# Patient Record
Sex: FEMALE | Race: OTHER | HISPANIC OR LATINO | URBAN - METROPOLITAN AREA
[De-identification: names, ages, dates, MRNs, and addresses within clinical notes are randomized per-mention and may not be internally consistent; named-entity substitution may affect disease eponyms.]

---
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
## 2018-07-05 ENCOUNTER — EMERGENCY (EMERGENCY)
Facility: HOSPITAL | Age: 18
LOS: 1 days | Discharge: HOME | End: 2018-07-05
Attending: EMERGENCY MEDICINE
Payer: MEDICAID

## 2018-07-05 VITALS
OXYGEN SATURATION: 98 % | HEIGHT: 60 IN | RESPIRATION RATE: 18 BRPM | TEMPERATURE: 98 F | DIASTOLIC BLOOD PRESSURE: 78 MMHG | SYSTOLIC BLOOD PRESSURE: 119 MMHG | HEART RATE: 92 BPM | WEIGHT: 100.09 LBS

## 2018-07-05 PROCEDURE — 99284 EMERGENCY DEPT VISIT MOD MDM: CPT

## 2018-07-05 RX ORDER — PSEUDOEPHEDRINE HCL 30 MG
1 TABLET ORAL
Qty: 30 | Refills: 0 | OUTPATIENT
Start: 2018-07-05

## 2018-07-05 RX ORDER — PSEUDOEPHEDRINE HCL 30 MG
60 TABLET ORAL ONCE
Qty: 0 | Refills: 0 | Status: COMPLETED | OUTPATIENT
Start: 2018-07-05 | End: 2018-07-05

## 2018-07-05 RX ORDER — ACETAMINOPHEN 500 MG
975 TABLET ORAL ONCE
Qty: 0 | Refills: 0 | Status: COMPLETED | OUTPATIENT
Start: 2018-07-05 | End: 2018-07-05

## 2018-07-05 RX ORDER — IBUPROFEN 200 MG
1 TABLET ORAL
Qty: 30 | Refills: 0 | OUTPATIENT
Start: 2018-07-05

## 2018-07-05 RX ADMIN — Medication 60 MILLIGRAM(S): at 22:04

## 2018-07-05 RX ADMIN — Medication 975 MILLIGRAM(S): at 22:04

## 2018-07-05 NOTE — ED PROVIDER NOTE - OBJECTIVE STATEMENT
16 y/o F pt with PMHx of H. Pylori Infection and no PSHx BIB father to ED c/o nasal congestion, runny nose, and cough x4 days as well as HA x1 day. Pt reports taking x2 tablets of Flanax (Naproxen) with no relief of sx's; pt last took Flanax at 19:00pm today. Pt describes mucous as green in color. Per pt, pt with recent sick contacts at home as pt's sibling (brother) currently has similar sx's. Pt notes she did not take her temperature at home PTA in ED today. Pt denies any other complaints. NKDA.

## 2018-07-05 NOTE — ED ADULT NURSE NOTE - OBJECTIVE STATEMENT
Pt AOx3, ambulatory, c/o headache, sinus pressure, cough, runny nose, x 1 week. Pt endorses nasal congestion, throat pain. Pt deneis n/v, dizziness, SOB.

## 2018-07-05 NOTE — ED ADULT TRIAGE NOTE - CHIEF COMPLAINT QUOTE
c/o headache x 1 day  denies any nausea and vomiting and also with nasal congestion. headache triggers when she coughs

## 2019-10-02 NOTE — ED ADULT NURSE NOTE - PAIN RATING/NUMBER SCALE (0-10): ACTIVITY
Pt would like lab order to be faxed to [facility] on [facility] today. Thanks. 8

## 2020-03-21 ENCOUNTER — EMERGENCY (EMERGENCY)
Facility: HOSPITAL | Age: 20
LOS: 1 days | Discharge: HOME | End: 2020-03-21
Attending: STUDENT IN AN ORGANIZED HEALTH CARE EDUCATION/TRAINING PROGRAM
Payer: SELF-PAY

## 2020-03-21 VITALS
RESPIRATION RATE: 16 BRPM | OXYGEN SATURATION: 100 % | SYSTOLIC BLOOD PRESSURE: 107 MMHG | HEART RATE: 68 BPM | DIASTOLIC BLOOD PRESSURE: 69 MMHG | TEMPERATURE: 98 F | WEIGHT: 115.08 LBS | HEIGHT: 60 IN

## 2020-03-21 PROBLEM — A04.8 OTHER SPECIFIED BACTERIAL INTESTINAL INFECTIONS: Chronic | Status: ACTIVE | Noted: 2018-07-05

## 2020-03-21 PROCEDURE — 99282 EMERGENCY DEPT VISIT SF MDM: CPT

## 2020-03-21 PROCEDURE — U0001: CPT

## 2020-03-21 PROCEDURE — 99283 EMERGENCY DEPT VISIT LOW MDM: CPT

## 2020-03-21 NOTE — ED PROVIDER NOTE - CLINICAL SUMMARY MEDICAL DECISION MAKING FREE TEXT BOX
Patient presents for symptoms of coronavirus.  Patient not tachypneic or hypoxic.  Will test, return precautions provided, will advise to self isolate.

## 2020-03-21 NOTE — ED ADULT NURSE NOTE - CAS ELECT INFOMATION PROVIDED
Patient seen and examined by MD/ACP. Patient swabbed for COVID-19 and discharged by MD/ACp./DC instructions

## 2020-03-21 NOTE — ED PROVIDER NOTE - OBJECTIVE STATEMENT
Patient presents to ED for testing for corona virus.  Patient presents with flu like symptoms for a few days, requesting testing done.

## 2020-03-21 NOTE — ED PROVIDER NOTE - PATIENT PORTAL LINK FT
You can access the FollowMyHealth Patient Portal offered by [facility] by registering at the following website: [email or web address]. By joining [facility]’s FollowMyHealth portal, you will also be able to view your health information using other applications (apps) compatible with our system.

## 2020-03-22 LAB — SARS-COV-2 RNA SPEC QL NAA+PROBE: DETECTED

## 2020-05-11 NOTE — ED ADULT NURSE NOTE - OBJECTIVE STATEMENT
c/o dizziness x11/2months, abdominal pain x2 weeks. weight  loss about 20lbs . Attending Only c/o dizziness x11/2months, abdominal pain x2 weeks. weight  loss about 20lbs .Abdomen soft non tender +BS.

## 2022-05-07 NOTE — ED PROVIDER NOTE - RELIEVING FACTORS
Lele Minor)  Pediatrics  [facility], [address] [place] [address]  Phone: [phone]  Fax: [phone]  Follow Up Time:   
none

## 2024-04-07 NOTE — ED ADULT NURSE NOTE - NSSISCREENINGQ4_ED_A_ED
Pt ambulated to bathroom at this time, no assistance required. No signs of distress noted.     Maame Guerrero  04/07/24 0414     No

## 2025-02-16 ENCOUNTER — INPATIENT (INPATIENT)
Facility: HOSPITAL | Age: 25
LOS: 1 days | Discharge: HOME | DRG: 951 | End: 2025-02-18
Attending: OBSTETRICS & GYNECOLOGY | Admitting: OBSTETRICS & GYNECOLOGY
Payer: OTHER GOVERNMENT

## 2025-02-16 VITALS
TEMPERATURE: 98 F | RESPIRATION RATE: 16 BRPM | OXYGEN SATURATION: 99 % | HEART RATE: 67 BPM | DIASTOLIC BLOOD PRESSURE: 67 MMHG | SYSTOLIC BLOOD PRESSURE: 115 MMHG

## 2025-02-16 DIAGNOSIS — O26.899 OTHER SPECIFIED PREGNANCY RELATED CONDITIONS, UNSPECIFIED TRIMESTER: ICD-10-CM

## 2025-02-16 NOTE — OB RN TRIAGE NOTE - NS_ARRIVALFROM_OBGYN_ALL_OB
ER physician updating pt on results and plan of care for discharge home with concerns addressed.   Home

## 2025-02-16 NOTE — OB RN TRIAGE NOTE - FALL HARM RISK - UNIVERSAL INTERVENTIONS
Bed in lowest position, wheels locked, appropriate side rails in place/Call bell, personal items and telephone in reach/Instruct patient to call for assistance before getting out of bed or chair/Non-slip footwear when patient is out of bed/[place] to call system/Physically safe environment - no spills, clutter or unnecessary equipment/Purposeful Proactive Rounding/Room/bathroom lighting operational, light cord in reach

## 2025-02-16 NOTE — OB RN TRIAGE NOTE - FALL HARM RISK - PATIENT NEEDS ASSISTANCE
Problem: Discharge Planning  Goal: Discharge to home or other facility with appropriate resources  Outcome: Progressing  Flowsheets (Taken 10/7/2022 1315)  Discharge to home or other facility with appropriate resources:   Identify barriers to discharge with patient and caregiver   Identify discharge learning needs (meds, wound care, etc)   Arrange for needed discharge resources and transportation as appropriate  Note: Patient from home with spouse. Patient plans to return home with spouse at discharge. Problem: Pain  Goal: Verbalizes/displays adequate comfort level or baseline comfort level  Outcome: Progressing  Flowsheets (Taken 10/7/2022 1315)  Verbalizes/displays adequate comfort level or baseline comfort level:   Encourage patient to monitor pain and request assistance   Administer analgesics based on type and severity of pain and evaluate response   Assess pain using appropriate pain scale   Implement non-pharmacological measures as appropriate and evaluate response  Note: Pain Assessment: 0-10  Pain Level: 6   Patient's Stated Pain Goal: 0 - No pain   Is pain goal met at this time? No     Non-Pharmaceutical Pain Intervention(s): Rest, Repositioned, and PRN pain medication. Problem: Safety - Adult  Goal: Free from fall injury  Outcome: Progressing  Flowsheets (Taken 10/7/2022 1315)  Free From Fall Injury: Instruct family/caregiver on patient safety  Note: No falls noted this shift. Fall risk assessment completed. Hourly rounding performed. Bed locked in lowest position, bed alarm on, call light and personal items within reach, and fall sign posted. Patient ambulates to the bathroom with staff assistance nearby.       Problem: ABCDS Injury Assessment  Goal: Absence of physical injury  Outcome: Progressing     Problem: Respiratory - Adult  Goal: Achieves optimal ventilation and oxygenation  Outcome: Progressing  Flowsheets (Taken 10/7/2022 1315)  Achieves optimal ventilation and oxygenation:   Assess for changes in respiratory status   Position to facilitate oxygenation and minimize respiratory effort   Assess the need for suctioning and aspirate as needed   Respiratory therapy support as indicated   Assess for changes in mentation and behavior   Oxygen supplementation based on oxygen saturation or arterial blood gases  Note: Lung sounds are clear and diminished. O2 saturation remains greater than 90% on 1L. Weaned off during day. Problem: Cardiovascular - Adult  Goal: Maintains optimal cardiac output and hemodynamic stability  Outcome: Progressing  Flowsheets (Taken 10/7/2022 1315)  Maintains optimal cardiac output and hemodynamic stability:   Monitor blood pressure and heart rate   Monitor urine output and notify Licensed Independent Practitioner for values outside of normal range   Assess for signs of decreased cardiac output  Note: Vital signs monitored every 4 hours. Continuous cardiac monitor remains in place. Vitals:    10/07/22 0730 10/07/22 1200 10/07/22 1232 10/07/22 1238   BP: (!) 100/51 (!) 114/56 112/70    Pulse: 77 72 78    Resp: 19 16 18 18   Temp: 97.5 °F (36.4 °C) 98 °F (36.7 °C) 97.7 °F (36.5 °C)    TempSrc:   Oral    SpO2: 96% 96% 96%    Weight: 214 lb 4.6 oz (97.2 kg) 213 lb 3 oz (96.7 kg)     Height:           Problem: Skin/Tissue Integrity - Adult  Goal: Skin integrity remains intact  Outcome: Progressing  Flowsheets (Taken 10/7/2022 1315)  Skin Integrity Remains Intact: Monitor for areas of redness and/or skin breakdown  Note: No new skin lesions noted this shift. Patient encouraged to reposition every two hours. Patient repositions self. Skin assessments completed and ongoing.       Problem: Infection - Adult  Goal: Absence of infection at discharge  Outcome: Progressing  Flowsheets (Taken 10/7/2022 1315)  Absence of infection at discharge:   Assess and monitor for signs and symptoms of infection   Monitor lab/diagnostic results   Monitor all insertion sites i.e., indwelling lines, tubes and drains   [place] appropriate cooling/warming therapies per order   Administer medications as ordered   Instruct and encourage patient and family to use good hand hygiene technique  Note: No signs of infection at this time. Problem: Metabolic/Fluid and Electrolytes - Adult  Goal: Electrolytes maintained within normal limits  Outcome: Progressing  Flowsheets (Taken 10/7/2022 1315)  Electrolytes maintained within normal limits:   Monitor labs and assess patient for signs and symptoms of electrolyte imbalances   Monitor response to electrolyte replacements, including repeat lab results as appropriate   Administer electrolyte replacement as ordered  Note: Continue to monitor lab work. Had hemodialysis today and 0.5L removed. Problem: Hematologic - Adult  Goal: Maintains hematologic stability  Outcome: Progressing  Flowsheets (Taken 10/7/2022 1315)  Maintains hematologic stability: Assess for signs and symptoms of bleeding or hemorrhage  Note: Continue to monitor lab work. Problem: Skin/Tissue Integrity  Goal: Absence of new skin breakdown  Description: 1. Monitor for areas of redness and/or skin breakdown  2. Assess vascular access sites hourly  3. Every 4-6 hours minimum:  Change oxygen saturation probe site  4. Every 4-6 hours:  If on nasal continuous positive airway pressure, respiratory therapy assess nares and determine need for appliance change or resting period.   Outcome: Progressing     Problem: Chronic Conditions and Co-morbidities  Goal: Patient's chronic conditions and co-morbidity symptoms are monitored and maintained or improved  Outcome: Progressing  Flowsheets (Taken 10/7/2022 1315)  Care Plan - Patient's Chronic Conditions and Co-Morbidity Symptoms are Monitored and Maintained or Improved:   Monitor and assess patient's chronic conditions and comorbid symptoms for stability, deterioration, or improvement   Collaborate with multidisciplinary team to address chronic and comorbid conditions and prevent exacerbation or deterioration  Note: Receives dialysis Monday, Wednesday, and Friday while in the hospital.     Problem: Nutrition Deficit:  Goal: Optimize nutritional status  Outcome: Progressing  Flowsheets (Taken 10/7/2022 1315)  Nutrient intake appropriate for improving, restoring, or maintaining nutritional needs:   Assess nutritional status and recommend course of action   Monitor oral intake, labs, and treatment plans   Recommend appropriate diets, oral nutritional supplements, and vitamin/mineral supplements   Order, calculate, and assess calorie counts as needed  Note: Patient on a carb controled diet. Patient tolerating well. Denies any nausea or emesis. Problem: Neurosensory - Adult  Goal: Achieves maximal functionality and self care  Outcome: Progressing  Flowsheets (Taken 10/7/2022 1315)  Achieves maximal functionality and self care:   Monitor swallowing and airway patency with patient fatigue and changes in neurological status   Encourage and assist patient to increase activity and self care with guidance from physical therapy/occupational therapy  Note: Alert and oriented x4. Care plan reviewed with patient and spouse. Patient and spouse verbalize understanding of the plan of care and contribute to goal setting.     Electronically signed by Horace Estrada RN on 10/7/2022 at 1:24 PM No assistance needed

## 2025-02-16 NOTE — OB RN TRIAGE NOTE - CHIEF COMPLAINT QUOTE
"I have been feeling pressure and pain while I urinate and my lower back has been paining for the last 3 hours"

## 2025-02-17 DIAGNOSIS — Z34.80 ENCOUNTER FOR SUPERVISION OF OTHER NORMAL PREGNANCY, UNSPECIFIED TRIMESTER: ICD-10-CM

## 2025-02-17 DIAGNOSIS — Z98.890 OTHER SPECIFIED POSTPROCEDURAL STATES: Chronic | ICD-10-CM

## 2025-02-17 LAB
ALBUMIN SERPL ELPH-MCNC: 3 G/DL — LOW (ref 3.5–5)
ALP SERPL-CCNC: 47 U/L — SIGNIFICANT CHANGE UP (ref 40–120)
ALT FLD-CCNC: 21 U/L DA — SIGNIFICANT CHANGE UP (ref 10–60)
ANION GAP SERPL CALC-SCNC: 7 MMOL/L — SIGNIFICANT CHANGE UP (ref 5–17)
APPEARANCE UR: CLEAR — SIGNIFICANT CHANGE UP
APTT BLD: 29.3 SEC — SIGNIFICANT CHANGE UP (ref 24.5–35.6)
AST SERPL-CCNC: 17 U/L — SIGNIFICANT CHANGE UP (ref 10–40)
BACTERIA # UR AUTO: ABNORMAL /HPF
BASOPHILS # BLD AUTO: 0.03 K/UL — SIGNIFICANT CHANGE UP (ref 0–0.2)
BASOPHILS NFR BLD AUTO: 0.3 % — SIGNIFICANT CHANGE UP (ref 0–2)
BILIRUB SERPL-MCNC: 0.1 MG/DL — LOW (ref 0.2–1.2)
BILIRUB UR-MCNC: NEGATIVE — SIGNIFICANT CHANGE UP
BLD GP AB SCN SERPL QL: SIGNIFICANT CHANGE UP
BUN SERPL-MCNC: 13 MG/DL — SIGNIFICANT CHANGE UP (ref 7–18)
CALCIUM SERPL-MCNC: 9.1 MG/DL — SIGNIFICANT CHANGE UP (ref 8.4–10.5)
CHLORIDE SERPL-SCNC: 107 MMOL/L — SIGNIFICANT CHANGE UP (ref 96–108)
CO2 SERPL-SCNC: 25 MMOL/L — SIGNIFICANT CHANGE UP (ref 22–31)
COLOR SPEC: ABNORMAL
CREAT SERPL-MCNC: 0.45 MG/DL — LOW (ref 0.5–1.3)
DIFF PNL FLD: ABNORMAL
EGFR: 136 ML/MIN/1.73M2 — SIGNIFICANT CHANGE UP
EOSINOPHIL # BLD AUTO: 0.08 K/UL — SIGNIFICANT CHANGE UP (ref 0–0.5)
EOSINOPHIL NFR BLD AUTO: 0.8 % — SIGNIFICANT CHANGE UP (ref 0–6)
EPI CELLS # UR: PRESENT
FIBRINOGEN PPP-MCNC: 458 MG/DL — SIGNIFICANT CHANGE UP (ref 200–475)
GLUCOSE SERPL-MCNC: 95 MG/DL — SIGNIFICANT CHANGE UP (ref 70–99)
GLUCOSE UR QL: NEGATIVE MG/DL — SIGNIFICANT CHANGE UP
HCT VFR BLD CALC: 36.3 % — SIGNIFICANT CHANGE UP (ref 34.5–45)
HGB BLD-MCNC: 12.2 G/DL — SIGNIFICANT CHANGE UP (ref 11.5–15.5)
IMM GRANULOCYTES NFR BLD AUTO: 0.6 % — SIGNIFICANT CHANGE UP (ref 0–0.9)
KETONES UR-MCNC: NEGATIVE MG/DL — SIGNIFICANT CHANGE UP
LEUKOCYTE ESTERASE UR-ACNC: NEGATIVE — SIGNIFICANT CHANGE UP
LYMPHOCYTES # BLD AUTO: 2.17 K/UL — SIGNIFICANT CHANGE UP (ref 1–3.3)
LYMPHOCYTES # BLD AUTO: 22 % — SIGNIFICANT CHANGE UP (ref 13–44)
MCHC RBC-ENTMCNC: 33.2 PG — SIGNIFICANT CHANGE UP (ref 27–34)
MCHC RBC-ENTMCNC: 33.6 G/DL — SIGNIFICANT CHANGE UP (ref 32–36)
MCV RBC AUTO: 98.6 FL — SIGNIFICANT CHANGE UP (ref 80–100)
MONOCYTES # BLD AUTO: 0.84 K/UL — SIGNIFICANT CHANGE UP (ref 0–0.9)
MONOCYTES NFR BLD AUTO: 8.5 % — SIGNIFICANT CHANGE UP (ref 2–14)
NEUTROPHILS # BLD AUTO: 6.67 K/UL — SIGNIFICANT CHANGE UP (ref 1.8–7.4)
NEUTROPHILS NFR BLD AUTO: 67.8 % — SIGNIFICANT CHANGE UP (ref 43–77)
NITRITE UR-MCNC: NEGATIVE — SIGNIFICANT CHANGE UP
NRBC BLD AUTO-RTO: 0 /100 WBCS — SIGNIFICANT CHANGE UP (ref 0–0)
PH UR: 6.5 — SIGNIFICANT CHANGE UP (ref 5–8)
PLATELET # BLD AUTO: 235 K/UL — SIGNIFICANT CHANGE UP (ref 150–400)
POTASSIUM SERPL-MCNC: 3.8 MMOL/L — SIGNIFICANT CHANGE UP (ref 3.5–5.3)
POTASSIUM SERPL-SCNC: 3.8 MMOL/L — SIGNIFICANT CHANGE UP (ref 3.5–5.3)
PROT SERPL-MCNC: 7.2 G/DL — SIGNIFICANT CHANGE UP (ref 6–8.3)
PROT UR-MCNC: NEGATIVE MG/DL — SIGNIFICANT CHANGE UP
RBC # BLD: 3.68 M/UL — LOW (ref 3.8–5.2)
RBC # FLD: 13.3 % — SIGNIFICANT CHANGE UP (ref 10.3–14.5)
RBC CASTS # UR COMP ASSIST: ABNORMAL /HPF
SODIUM SERPL-SCNC: 139 MMOL/L — SIGNIFICANT CHANGE UP (ref 135–145)
SP GR SPEC: 1.01 — SIGNIFICANT CHANGE UP (ref 1–1.03)
URATE SERPL-MCNC: 2.9 MG/DL — SIGNIFICANT CHANGE UP (ref 2.5–7)
UROBILINOGEN FLD QL: 0.2 MG/DL — SIGNIFICANT CHANGE UP (ref 0.2–1)
WBC # BLD: 9.85 K/UL — SIGNIFICANT CHANGE UP (ref 3.8–10.5)
WBC # FLD AUTO: 9.85 K/UL — SIGNIFICANT CHANGE UP (ref 3.8–10.5)
WBC UR QL: 0 /HPF — SIGNIFICANT CHANGE UP (ref 0–5)

## 2025-02-17 PROCEDURE — 76775 US EXAM ABDO BACK WALL LIM: CPT | Mod: 26

## 2025-02-17 RX ORDER — INFLUENZA A VIRUS A/IDAHO/07/2018 (H1N1) ANTIGEN (MDCK CELL DERIVED, PROPIOLACTONE INACTIVATED, INFLUENZA A VIRUS A/INDIANA/08/2018 (H3N2) ANTIGEN (MDCK CELL DERIVED, PROPIOLACTONE INACTIVATED), INFLUENZA B VIRUS B/SINGAPORE/INFTT-16-0610/2016 ANTIGEN (MDCK CELL DERIVED, PROPIOLACTONE INACTIVATED), INFLUENZA B VIRUS B/IOWA/06/2017 ANTIGEN (MDCK CELL DERIVED, PROPIOLACTONE INACTIVATED) 15; 15; 15; 15 UG/.5ML; UG/.5ML; UG/.5ML; UG/.5ML
0.5 INJECTION, SUSPENSION INTRAMUSCULAR ONCE
Refills: 0 | Status: DISCONTINUED | OUTPATIENT
Start: 2025-02-17 | End: 2025-02-18

## 2025-02-17 RX ORDER — CEFTRIAXONE 500 MG/1
1000 INJECTION, POWDER, FOR SOLUTION INTRAMUSCULAR; INTRAVENOUS ONCE
Refills: 0 | Status: COMPLETED | OUTPATIENT
Start: 2025-02-17 | End: 2025-02-17

## 2025-02-17 RX ORDER — SODIUM CHLORIDE 9 G/1000ML
1000 INJECTION, SOLUTION INTRAVENOUS
Refills: 0 | Status: DISCONTINUED | OUTPATIENT
Start: 2025-02-17 | End: 2025-02-18

## 2025-02-17 RX ORDER — CEFTRIAXONE 500 MG/1
1000 INJECTION, POWDER, FOR SOLUTION INTRAMUSCULAR; INTRAVENOUS EVERY 24 HOURS
Refills: 0 | Status: DISCONTINUED | OUTPATIENT
Start: 2025-02-18 | End: 2025-02-18

## 2025-02-17 RX ORDER — PRENATAL 136/IRON/FOLIC ACID 27 MG-1 MG
1 TABLET ORAL DAILY
Refills: 0 | Status: DISCONTINUED | OUTPATIENT
Start: 2025-02-17 | End: 2025-02-17

## 2025-02-17 RX ORDER — CEFTRIAXONE 500 MG/1
INJECTION, POWDER, FOR SOLUTION INTRAMUSCULAR; INTRAVENOUS
Refills: 0 | Status: DISCONTINUED | OUTPATIENT
Start: 2025-02-17 | End: 2025-02-18

## 2025-02-17 RX ORDER — ACETAMINOPHEN 500 MG/5ML
1000 LIQUID (ML) ORAL ONCE
Refills: 0 | Status: COMPLETED | OUTPATIENT
Start: 2025-02-17 | End: 2025-02-17

## 2025-02-17 RX ADMIN — SODIUM CHLORIDE 125 MILLILITER(S): 9 INJECTION, SOLUTION INTRAVENOUS at 23:00

## 2025-02-17 RX ADMIN — SODIUM CHLORIDE 125 MILLILITER(S): 9 INJECTION, SOLUTION INTRAVENOUS at 02:16

## 2025-02-17 RX ADMIN — Medication 1000 MILLIGRAM(S): at 01:00

## 2025-02-17 RX ADMIN — Medication 400 MILLIGRAM(S): at 01:22

## 2025-02-17 RX ADMIN — Medication 4 MILLIGRAM(S): at 02:20

## 2025-02-17 RX ADMIN — CEFTRIAXONE 100 MILLIGRAM(S): 500 INJECTION, POWDER, FOR SOLUTION INTRAMUSCULAR; INTRAVENOUS at 02:15

## 2025-02-17 RX ADMIN — SODIUM CHLORIDE 125 MILLILITER(S): 9 INJECTION, SOLUTION INTRAVENOUS at 01:54

## 2025-02-17 RX ADMIN — Medication 4 MILLIGRAM(S): at 07:00

## 2025-02-17 NOTE — OB PROVIDER H&P - NSHPPHYSICALEXAM_GEN_ALL_CORE
gen: axox3, no acute distress  abd: soft, gravid, non tender, no guarding  ext: no edema, B/L, no calf tenderness, B/L    +CVA tenderness, B/L  urine dark red    FHR: 145  [place]: no ctx  sve: deferred

## 2025-02-17 NOTE — OB PROVIDER TRIAGE NOTE - NSHPPHYSICALEXAM_GEN_ALL_CORE
gen: axox3, no acute distress  abd: soft, gravid, non tender, no guarding  ext: no edema, B/L, no calf tenderness, B/L    +CVA tenderness, B/L    FHR:  [employer]: no ctx  sve: deferred gen: axox3, no acute distress  abd: soft, gravid, non tender, no guarding  ext: no edema, B/L, no calf tenderness, B/L    +CVA tenderness, B/L  urine red    FHR: 145  [place]: no ctx  sve: deferred

## 2025-02-17 NOTE — OB PROVIDER H&P - ASSESSMENT
A/P 25yo  at 22wk gest. presents for Pyelonephritis. Afebrile, VSS. No contractions.     -Admit to antepartum   -Admission orders  -Consents at bedside  -Obtain prenatal records  -UA with culture pending  -IV Tylenol and 4mg Morphine IVP  -continue to maternal/fetal monitoring     d/w Dr. Villarreal (House Attending)

## 2025-02-17 NOTE — OB RN PATIENT PROFILE - NSSDOHCURSE_OBGYN_A_OB
Sotyktu Pregnancy And Lactation Text: There is insufficient data to evaluate whether or not Sotyktu is safe to use during pregnancy.? ?It is not known if Sotyktu passes into breast milk and whether or not it is safe to use when breastfeeding.?? never

## 2025-02-17 NOTE — OB PROVIDER H&P - NS_RSVVACCINERECEIVED_OBGYN_ALL_OB
"Occupational Therapy   Initial Evaluation     Patient Name: Annetta Ohara  Age:  66 y.o., Sex:  male  Medical Record #: [id number]  Today's Date: 9/3/2023     Precautions: Fall Risk, Toe Touch Weight Bearing Right Lower Extremity  Comments: ROMAT RLE    Assessment  Patient is 66 y.o. male admitted for traumatic injuries after being bucked off his horse. Pt was previously active and independent. This admission pt is dx w/closed bilateral pubic rami fx, w/associated pelvic hematoma. Pt is being managed non-operatively and is TTWB RLE. Today pt demonstrated ability to complete short distance ambulation, functional txfs and standing ADL's w/spv. Pt needed CGA for LB dressing tasks and reports his SO is able to assist. May benefit from 1-2 more tx otherwise anticipate progression in this setting. Defer dc recommendations to PT.     Plan  Occupational Therapy Initial Treatment Plan   Treatment Interventions: Self Care / Activities of Daily Living, Adaptive Equipment, Therapeutic Activity, Therapeutic Exercises  Treatment Frequency: 4 Times per Week  Duration: Until Therapy Goals Met    DC Equipment Recommendations: Tub Transfer Bench  Discharge Recommendations:  (HH vs no needs defer to PT)     Subjective  \"It hurts when I move but its manageable\"      Objective   09/03/23 0903   Charge Group   OT Evaluation OT Evaluation Low   OT Self Care / ADL (Units) 1   Total Time Spent   OT Time Spent Yes   OT Self Care / ADL (Minutes) 15   OT Evaluation (Minutes) 15   OT Total Time Spent (Calculated) 30   Initial Contact Note    Initial Contact Note Order Received and Verified, Occupational Therapy Evaluation in Progress with Full Report to Follow.   Prior Living Situation   Prior Services None   Housing / Facility 1 Story House   Steps Into Home 3   Steps In Home 2   Bathroom Set up Bathtub / Shower Combination   Equipment Owned None   Lives with - Patient's Self Care Capacity Spouse   Comments Patient reported spouse able to assist " as needed   Prior Level of ADL Function   Self Feeding Independent   Grooming / Hygiene Independent   Bathing Independent   Dressing Independent   Toileting Independent   Prior Level of IADL Function   Medication Management Independent   Laundry Independent   Kitchen Mobility Independent   Finances Independent   Home Management Independent   Shopping Independent   Prior Level Of Mobility Independent Without Device in Community   Driving / Transportation Driving Independent   Precautions   Precautions Fall Risk;Toe Touch Weight Bearing Right Lower Extremity   Pain 0 - 10 Group   Location Pelvis   Location Orientation Right   Pain Rating Scale (NPRS) 1   Therapist Pain Assessment During Activity;Nurse Notified   Cognition    Cognition / Consciousness WDL   Level of Consciousness Alert   Active ROM Upper Body   Active ROM Upper Body  WDL   Strength Upper Body   Upper Body Strength  WDL   Balance Assessment   Sitting Balance (Static) Fair   Sitting Balance (Dynamic) Fair   Standing Balance (Static) Fair   Standing Balance (Dynamic) Fair   Weight Shift Sitting Fair   Weight Shift Standing Fair   Comments w/fww   Bed Mobility    Comments in chair pre and post   ADL Assessment   Grooming Supervision;Standing   Upper Body Dressing Supervision   Lower Body Dressing Contact Guard Assist   Toileting Supervision   Comments stood at toilet and sink cues for walker placement demonstrated LB dressing strategies and discussed tub shower safety and fall prevention   How much help from another person does the patient currently need...   6 Clicks Daily Activity Score 22   Functional Mobility   Sit to Stand Standby Assist   Bed, Chair, Wheelchair Transfer Standby Assist   Toilet Transfers Standby Assist   Mobility walking in room w/fww   Visual Perception   Visual Perception  Not Tested   Activity Tolerance   Comments no overt c/o pain or fatigue   Patient / Family Goals   Patient / Family Goal #1 to go home   Short Term Goals   Short  Term Goal # 1 pt will complete FB dressing w/spv   Short Term Goal # 2 pt will complete toilet txf and ramona care w/spv   Education Group   Education Provided Home Safety;Transfers;Role of Occupational Therapist;Adaptive Equipment;Activities of Daily Living;Weight Bearing Precautions   Role of Occupational Therapist Patient Response Patient;Acceptance;Explanation;Demonstration   Home Safety Patient Response Patient;Acceptance;Explanation;Demonstration   Transfers Patient Response Patient;Acceptance;Explanation;Demonstration   ADL Patient Response Patient;Acceptance;Explanation;Demonstration   Adaptive Equipment Patient Response Patient;Acceptance;Explanation;Demonstration   Weight Bearing Precautions Patient Response Patient;Acceptance;Demonstration;Explanation   Occupational Therapy Initial Treatment Plan    Treatment Interventions Self Care / Activities of Daily Living;Adaptive Equipment;Therapeutic Activity;Therapeutic Exercises   Treatment Frequency 4 Times per Week   Duration Until Therapy Goals Met   Problem List   Problem List Decreased Active Daily Living Skills;Decreased Activity Tolerance   Anticipated Discharge Equipment and Recommendations   DC Equipment Recommendations Tub Transfer Bench   Discharge Recommendations   (HH vs no needs defer to PT)   Interdisciplinary Plan of Care Collaboration   IDT Collaboration with  Nursing   Patient Position at End of Therapy Seated;Chair Alarm On;Call Light within Reach;Tray Table within Reach;Phone within Reach   Collaboration Comments RN aware of OT eval and pts efforts   Session Information   Date / Session Number  9/3 #1 (1/4, 9/9)                  No

## 2025-02-17 NOTE — OB PROVIDER H&P - NSLOWPPHRISK_OBGYN_A_OB
No previous uterine incision/Lang Pregnancy/Less than or equal to 4 previous vaginal births/No history of postpartum hemorrhage/No other PPH risks indicated

## 2025-02-17 NOTE — OB PROVIDER H&P - HISTORY OF PRESENT ILLNESS
23yo  at 22wks presents for urinary urgency and lower back pain. Reports + fetal movement. Denies vaginal bleeding, loss of fluid, and/or contractions.   Denies headache, visual disturbances, epigastric pain, chest pain, shortness of breath, N/V/D/C, fever, chills, abdominal pain.  PNC: follows with provider in [place]    OBHx: Primigravida  GynHx: denies fibroids, cysts, STD, abnormal PAP  PMHx: Denies   Meds: PNV  PSHx: Eye sx, at age 1  Allergies: no known allergies   Social: denies tobacco/etoh/drug use   Psych: denies anxiety, depression, PTSD

## 2025-02-17 NOTE — OB PROVIDER TRIAGE NOTE - HISTORY OF PRESENT ILLNESS
25yo  at 22wks presents for urinary urgency and lower back pain. Reports + fetal movement. Denies vaginal bleeding, loss of fluid, and/or contractions.   Denies headache, visual disturbances, epigastric pain, chest pain, shortness of breath, N/V/D/C, fever, chills, abdominal pain.  PNC: follows with provider in [place]    OBHx: Primigravida  GynHx: denies fibroids, cysts, STD, abnormal PAP  PMHx: Denies   Meds: PNV  PSHx: Eye sx, at age 1  Allergies: no known allergies   Social: denies tobacco/etoh/drug use   Psych: denies anxiety, depression, PTSD

## 2025-02-17 NOTE — OB PROVIDER TRIAGE NOTE - NSOBPROVIDERNOTE_OBGYN_ALL_OB_FT
a/p 25yo  at 22wks presents for r/o UTI/Pyelonephritis. Afebrile, VSS. No contractions.     -UA with culture  -continue to maternal and fetal monitoring  -Reassess after results     d/w Dr. Villarreal (House Attending) a/p 25yo  at 22wks presents for r/o UTI/Pyelonephritis. Afebrile, VSS. No contractions.     -UA with culture  -Admit to antepartum   -continue to maternal and fetal monitoring    d/w Dr. Villarreal (House Attending)

## 2025-02-18 VITALS
HEART RATE: 78 BPM | OXYGEN SATURATION: 99 % | RESPIRATION RATE: 18 BRPM | TEMPERATURE: 98 F | DIASTOLIC BLOOD PRESSURE: 59 MMHG | SYSTOLIC BLOOD PRESSURE: 101 MMHG

## 2025-02-18 DIAGNOSIS — N12 TUBULO-INTERSTITIAL NEPHRITIS, NOT SPECIFIED AS ACUTE OR CHRONIC: ICD-10-CM

## 2025-02-18 PROCEDURE — 86850 RBC ANTIBODY SCREEN: CPT

## 2025-02-18 PROCEDURE — 80053 COMPREHEN METABOLIC PANEL: CPT

## 2025-02-18 PROCEDURE — 81001 URINALYSIS AUTO W/SCOPE: CPT

## 2025-02-18 PROCEDURE — 84550 ASSAY OF BLOOD/URIC ACID: CPT

## 2025-02-18 PROCEDURE — 36415 COLL VENOUS BLD VENIPUNCTURE: CPT

## 2025-02-18 PROCEDURE — 85384 FIBRINOGEN ACTIVITY: CPT

## 2025-02-18 PROCEDURE — 86901 BLOOD TYPING SEROLOGIC RH(D): CPT

## 2025-02-18 PROCEDURE — 85025 COMPLETE CBC W/AUTO DIFF WBC: CPT

## 2025-02-18 PROCEDURE — 86900 BLOOD TYPING SEROLOGIC ABO: CPT

## 2025-02-18 PROCEDURE — 76775 US EXAM ABDO BACK WALL LIM: CPT

## 2025-02-18 PROCEDURE — 85730 THROMBOPLASTIN TIME PARTIAL: CPT

## 2025-02-18 RX ORDER — NITROFURANTOIN MACROCRYSTAL 100 MG
1 CAPSULE ORAL
Qty: 20 | Refills: 0
Start: 2025-02-18 | End: 2025-02-27

## 2025-02-18 RX ADMIN — CEFTRIAXONE 100 MILLIGRAM(S): 500 INJECTION, POWDER, FOR SOLUTION INTRAMUSCULAR; INTRAVENOUS at 01:43

## 2025-02-18 RX ADMIN — SODIUM CHLORIDE 125 MILLILITER(S): 9 INJECTION, SOLUTION INTRAVENOUS at 09:10

## 2025-02-18 NOTE — DISCHARGE NOTE ANTEPARTUM - CARE PLAN
1 Principal Discharge DX:	Pyelonephritis  Assessment and plan of treatment:	continue oral antibiotics twice daily for 10 days  f/u with OB this week   increase hydration  cranberry supplements

## 2025-02-18 NOTE — DISCHARGE NOTE ANTEPARTUM - PLAN OF CARE
continue oral antibiotics twice daily for 10 days  f/u with OB this week   increase hydration  cranberry supplements

## 2025-02-18 NOTE — PROGRESS NOTE ADULT - SUBJECTIVE AND OBJECTIVE BOX
Patient seen resting comfortably.  No current complaints.  Denies HA, CP, SOB, N/V/D, dizziness, palpitations, worsening abdominal pain, vaginal bleeding, or any other concerns.  Ambulating and voiding without difficulty.  Passing flatus and tolerating regular diet.  States that pain has improved greatly since admission.     Vital Signs Last 24 Hrs  T(C): 36.5 (2025 09:30), Max: 37.1 (2025 01:53)  T(F): 97.7 (2025 09:30), Max: 98.8 (2025 01:53)  HR: 93 (2025 09:30) (67 - 93)  BP: 108/73 (2025 09:30) (108/73 - 124/70)  RR: 17 (2025 09:30) (16 - 18)  SpO2: 98% (:30) (98% - 100%)    Parameters below as of 2025 09:30  Patient On (Oxygen Delivery Method): room air        Physical Exam:     Gen: A&Ox 3, NAD  Chest: CTABL  Cardiac: S1+S2+ RRR  Breast: Soft, nontender  Abdomen: Soft, nontender, gravid uterus  Back: Improved CVA tenderness   Gyn: No vaginal bleeding   Extremities: Nontender, DTRS 2+, no worsening edema    Sono:  +FH +FM.  Appropriate fluid noted on bedside sonogram                          12.2   9.85  )-----------( 235      ( 2025 01:18 )             36.3       A/P:   24 year old  at 22wks gestation admitted for pyelonephritis.     -Continue rocephin  -Continue IV fluids  -Pain management as needed  -Follow up culture  -Check FH q shift  -Continue current care      Dr Royal aware 
Pt seen at bedside with no complaints at this time. Pt reports her pain has completely resolved. Pt is tolerating regular diet, ambulating and voiding adequately. Pt states +fm, no vb, no rom, no ctx, no fever, or chills, no cp; no sob; no n/v/d/c, no h/a, blurry vision or epigastric pain, no dysuria, urgency or frequency.     Vital Signs Last 24 Hrs  T(C): 36.4 (18 Feb 2025 09:37), Max: 36.6 (17 Feb 2025 18:02)  T(F): 97.6 (18 Feb 2025 09:37), Max: 97.8 (17 Feb 2025 18:02)  HR: 78 (18 Feb 2025 09:37) (77 - 89)  BP: 101/59 (18 Feb 2025 09:37) (100/62 - 115/71)  RR: 18 (18 Feb 2025 09:37) (16 - 18)  SpO2: 99% (18 Feb 2025 09:37) (97% - 100%)    Parameters below as of 18 Feb 2025 09:37  Patient On (Oxygen Delivery Method): room air    PE:   Gen: A&Ox3; NAD  Abd: soft/nt/gravid  back: no cvat b/l  fh: 140s reassuring for gest age  sse: deferred  sve: deferred  ext: no calf pain; no worsening edema    Urinalysis Basic - ( 17 Feb 2025 01:18 )    Color: x / Appearance: x / SG: x / pH: x  Gluc: 95 mg/dL / Ketone: x  / Bili: x / Urobili: x   Blood: x / Protein: x / Nitrite: x   Leuk Esterase: x / RBC: x / WBC x   Sq Epi: x / Non Sq Epi: x / Bacteria: x      CBC Full  -  ( 17 Feb 2025 01:18 )  WBC Count : 9.85 K/uL  RBC Count : 3.68 M/uL  Hemoglobin : 12.2 g/dL  Hematocrit : 36.3 %  Platelet Count - Automated : 235 K/uL  Mean Cell Volume : 98.6 fl  Mean Cell Hemoglobin : 33.2 pg  Mean Cell Hemoglobin Concentration : 33.6 g/dL  Auto Neutrophil # : x  Auto Lymphocyte # : x  Auto Monocyte # : x  Auto Eosinophil # : x  Auto Basophil # : x  Auto Neutrophil % : x  Auto Lymphocyte % : x  Auto Monocyte % : x  Auto Eosinophil % : x  Auto Basophil % : x    02-17    139  |  107  |  13  ----------------------------<  95  3.8   |  25  |  0.45[L]    Ca    9.1      17 Feb 2025 01:18    TPro  7.2  /  Alb  3.0[L]  /  TBili  0.1[L]  /  DBili  x   /  AST  17  /  ALT  21  /  AlkPhos  47  02-17 -d/w

## 2025-02-18 NOTE — DISCHARGE NOTE ANTEPARTUM - MEDICATION SUMMARY - MEDICATIONS TO TAKE
I will START or STAY ON the medications listed below when I get home from the hospital:    Macrobid 100 mg oral capsule  -- 1 cap(s) by mouth 2 times a day  -- Indication: For for urinary infection

## 2025-02-18 NOTE — DISCHARGE NOTE ANTEPARTUM - CARE PROVIDER_API CALL
Yudi Foster  [address] [address]  Phone: [phone]  Fax: (   )    -  Established Patient  Follow Up Time: 1 week

## 2025-02-18 NOTE — DISCHARGE NOTE ANTEPARTUM - PROVIDER TOKENS
FREE:[LAST:[[place]athers],FIRST:[Yudi],PHONE:[phone]],FAX:[(   )    -],ADDRESS:[[address] [address]],FOLLOWUP:[1 week],ESTABLISHEDPATIENT:[T]]

## 2025-02-18 NOTE — DISCHARGE NOTE ANTEPARTUM - FINANCIAL ASSISTANCE
[facility] provides services at a reduced cost to those who are determined to be eligible through [facility]’s financial assistance program. Information regarding [facility]’s financial assistance program can be found by going to [email or web address] or by calling [phone].

## 2025-02-18 NOTE — DISCHARGE NOTE ANTEPARTUM - HOSPITAL COURSE
admitted for pyelonephritis at 22 weeks gestation    s/p IV antibiotics x 2 doses    patient feels improvement of pain with abx treatment     Will be discharged home on macrobid BID x 10 days    stable for discharge

## 2025-02-18 NOTE — DISCHARGE NOTE ANTEPARTUM - MEDICATION SUMMARY - MEDICATIONS TO STOP TAKING
I will STOP taking the medications listed below when I get home from the hospital:     mg oral tablet  -- 1 tab(s) by mouth every 8 hours, As Needed - for moderate pain  -- Do not take this drug if you are pregnant.  It is very important that you take or use this exactly as directed.  Do not skip doses or discontinue unless directed by your doctor.  May cause drowsiness or dizziness.  Obtain medical advice before taking any non-prescription drugs as some may affect the action of this medication.  Take with food or milk.    pseudoephedrine 60 mg oral tablet  -- 1 tab(s) by mouth every 8 hours, As Needed - for congestion    benzonatate 100 mg oral capsule  -- 1 cap(s) by mouth 3 times a day, As Needed - for cough  -- May cause drowsiness.  Alcohol may intensify this effect.  Use care when operating dangerous machinery.  Swallow whole.  Do not crush.